# Patient Record
(demographics unavailable — no encounter records)

---

## 2025-02-03 NOTE — HISTORY OF PRESENT ILLNESS
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  Mother had had NIPS during her pregnancy and they were told that her baby had Tapia syndrome.  She was evaluated by cardiology in May 2019 and had no cardiac defect. U/S  kidney in Aug 2018 showed normal kidneys .  I ordered GH in Oct 2021 but she was only started in late Dec 2021.  She was last seen in Aug 2024 growing at 5.6 cm/yr. I increased her dose of growth hormone to 1 mg daily (0.37 mg/kg/week).  Last saw cardiology in May 2024 and examination noted to be normal. Dr. Puente did also appreciate a heart murmur but noted it to be benign.  Her sister Cristin was born in September 2023.    1st grade [TWNoteComboBox1] : Tapia syndrome

## 2025-02-03 NOTE — CONSULT LETTER
[Dear  ___] : Dear  [unfilled], [Courtesy Letter:] : I had the pleasure of seeing your patient, [unfilled], in my office today. [Please see my note below.] : Please see my note below. [Consult Closing:] : Thank you very much for allowing me to participate in the care of this patient.  If you have any questions, please do not hesitate to contact me. [Sincerely,] : Sincerely, [FreeTextEntry2] : KATHIE RODRIGUES\par   [FreeTextEntry3] : Raul Fairbanks MD\par

## 2025-02-03 NOTE — PHYSICAL EXAM
[Healthy Appearing] : healthy appearing [Well Nourished] : well nourished [Interactive] : interactive [Looks Younger than Stated Years] : looks younger than stated years [Normal Appearance] : normal appearance [Well formed] : well formed [Normally Set] : normally set [Normal S1 and S2] : normal S1 and S2 [Clear to Ausculation Bilaterally] : clear to auscultation bilaterally [Abdomen Soft] : soft [Abdomen Tenderness] : non-tender [] : no hepatosplenomegaly [1] : was Trevor stage 1 [Trevor Stage ___] : the Trevor stage for breast development was [unfilled] [Normal] : normal  [Murmur] : murmur was appreciated [de-identified] : 2/6 vibratory systolic murmur

## 2025-02-03 NOTE — PHYSICAL EXAM
[Healthy Appearing] : healthy appearing [Well Nourished] : well nourished [Interactive] : interactive [Looks Younger than Stated Years] : looks younger than stated years [Normal Appearance] : normal appearance [Well formed] : well formed [Normally Set] : normally set [Normal S1 and S2] : normal S1 and S2 [Clear to Ausculation Bilaterally] : clear to auscultation bilaterally [Abdomen Soft] : soft [Abdomen Tenderness] : non-tender [] : no hepatosplenomegaly [1] : was Trevor stage 1 [Trevor Stage ___] : the Trevor stage for breast development was [unfilled] [Normal] : normal  [Murmur] : murmur was appreciated [de-identified] : 2/6 vibratory systolic murmur

## 2025-05-06 NOTE — REASON FOR VISIT
[Follow-Up] : a follow-up visit for [Tapia Syndrome] : Tapia syndrome [Patient] : patient [Father] : father

## 2025-05-09 NOTE — CONSULT LETTER
[Today's Date] : [unfilled] [Name] : Name: [unfilled] [] : : ~~ [Today's Date:] : [unfilled] [Dear  ___:] : Dear Dr. [unfilled]: [Consult] : I had the pleasure of evaluating your patient, [unfilled]. My full evaluation follows. [Consult - Single Provider] : Thank you very much for allowing me to participate in the care of this patient. If you have any questions, please do not hesitate to contact me. [Sincerely,] : Sincerely, [DrReilly  ___] : Dr. RAY [FreeTextEntry4] : Becka Weathers MD [FreeTextEntry5] : [address] [facility] [address] [FreeTextEntry6] : [id number] [de-identified] : Kendra Tatum MD\par  Pediatric Cardiologist\par  [facility]\par  [address] [place] [address]\par  [place], [place] [address]\par  Phone: [phone]\par  FAX: [phone]\par

## 2025-05-09 NOTE — CARDIOLOGY SUMMARY
[FreeTextEntry1] : The electrocardiogram today revealed a normal sinus rhythm at a rate of 84 bpm, with a normal axis and normal ventricular forces. The measured intervals were normal. There was no ectopy seen on the surface electrocardiogram.  [FreeTextEntry2] : Summary: 1. Tapia syndrome. 2.  {S,D,S  } Situs solitus, D-ventricular looping, normally related great arteries. 3. No evidence of an atrial septal defect and intact atrial septum. 4. Normal mitral valve morphology and inflow Doppler profile. 5. Normal aortic root. 6. Aortic sinuses of Valsalva dimension (systole) = 2.0 cm (z = 0.34). 7. No evidence of aortic valve regurgitation. 8. The aortic root in cross section (PSAX) measures: 1.95 cm X 2.13 cm X 2.03 cm. The ascending aorta in cross section (PSAX) at the level of the right pulmonary artery measures: 1.74 cm. The proximal abdominal aorta and the thoracic descending aorta appear normal in caliber and uniform in contour. 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  9. Normal ascending aorta. 10. Ascending aorta dimension (systole) = 1.70 cm (z = 0.08). 11. Normal left ventricular size, morphology and systolic function. 12. Left ventricular ejection fraction by 5/6 Area x Length is normal at 68 %. 13. Normal left ventricular diastolic function. 14. Normal right ventricular morphology with qualitatively normal size and systolic function. 15. No pericardial effusion. 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

## 2025-05-09 NOTE — HISTORY OF PRESENT ILLNESS
[FreeTextEntry1] : Rebeca was evaluated at the cardiology office at the [facility] on May 6, 2025.  She is now a 7-year-old child who is followed in pediatric cardiology with the diagnosis of Tapia syndrome. Her last evaluation in our division was on May 7, 2024.  At the time of her last cardiology visit, she had a normal cardiac evaluation with no evidence of an aortopathy.  She was accompanied to the office visit today by her father.  Rebeca has had confirmatory chromosomes performed on April 2, 2018. 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   Rebeca has been doing quite nicely at home with normal development.  She is an active youngster who participates in [employer] and gymnastics and does so without any difficulties.  She is of short stature. She is followed in pediatric endocrinology by Dr. Fairbanks.  She was last seen by Dr. Fairbanks on February 3, 2025.  Rebeca was started on therapy with growth hormone in December 2021.  She is tolerating this medication. She has no known allergies. Her immunizations are up to date. She has had no previous hospitalizations or surgeries.  She is in first grade and does well socially and academically.  A review of systems was otherwise unremarkable.  Birth history: Rebeca was the 2.45 kg product of a 37-week gestation. The pregnancy was notable in that the NIPS on the fetus was positive for Monosomy X (Tapia syndrome). A fetal echocardiogram was performed at 21 weeks gestation and was within normal limits. It was recommended that the infant have followup in pediatric cardiology after birth.  A pediatric cardiology consultation was performed on April 2, 2018 while Rebeca was in the hospital. An echocardiogram performed at that time showed no evidence of a coarctation of the aorta and normal aortic valve morphology. She also had a normal renal ultrasound on August 17, 2018.  There is no family history of congenital heart disease, connective tissue disorders, cardiac surgery, or sudden unexplained death.

## 2025-05-09 NOTE — HISTORY OF PRESENT ILLNESS
[FreeTextEntry1] : Rebeca was evaluated at the cardiology office at the [facility] on May 6, 2025.  She is now a 7-year-old child who is followed in pediatric cardiology with the diagnosis of Tapia syndrome. Her last evaluation in our division was on May 7, 2024.  At the time of her last cardiology visit, she had a normal cardiac evaluation with no evidence of an aortopathy.  She was accompanied to the office visit today by her father.  Rebeca has had confirmatory chromosomes performed on April 2, 2018. 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   Rebeca has been doing quite nicely at home with normal development.  She is an active youngster who participates in [employer] and gymnastics and does so without any difficulties.  She is of short stature. She is followed in pediatric endocrinology by Dr. Fairbanks.  She was last seen by Dr. Fairbanks on February 3, 2025.  Rebeca was started on therapy with growth hormone in December 2021.  She is tolerating this medication. She has no known allergies. Her immunizations are up to date. She has had no previous hospitalizations or surgeries.  She is in first grade and does well socially and academically.  A review of systems was otherwise unremarkable.  Birth history: Rebeca was the 2.45 kg product of a 37-week gestation. The pregnancy was notable in that the NIPS on the fetus was positive for Monosomy X (Tapia syndrome). A fetal echocardiogram was performed at 21 weeks gestation and was within normal limits. It was recommended that the infant have followup in pediatric cardiology after birth.  A pediatric cardiology consultation was performed on April 2, 2018 while Rebeca was in the hospital. An echocardiogram performed at that time showed no evidence of a coarctation of the aorta and normal aortic valve morphology. She also had a normal renal ultrasound on August 17, 2018.  There is no family history of congenital heart disease, connective tissue disorders, cardiac surgery, or sudden unexplained death.

## 2025-05-09 NOTE — CARDIOLOGY SUMMARY
[FreeTextEntry1] : The electrocardiogram today revealed a normal sinus rhythm at a rate of 84 bpm, with a normal axis and normal ventricular forces. The measured intervals were normal. There was no ectopy seen on the surface electrocardiogram.  [FreeTextEntry2] : Summary: 1. Tapia syndrome. 2.  {S,D,S  } Situs solitus, D-ventricular looping, normally related great arteries. 3. No evidence of an atrial septal defect and intact atrial septum. 4. Normal mitral valve morphology and inflow Doppler profile. 5. Normal aortic root. 6. Aortic sinuses of Valsalva dimension (systole) = 2.0 cm (z = 0.34). 7. No evidence of aortic valve regurgitation. 8. The aortic root in cross section (PSAX) measures: 1.95 cm X 2.13 cm X 2.03 cm. The ascending aorta in cross section (PSAX) at the level of the right pulmonary artery measures: 1.74 cm. The proximal abdominal aorta and the thoracic descending aorta appear normal in caliber and uniform in contour. 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  9. Normal ascending aorta. 10. Ascending aorta dimension (systole) = 1.70 cm (z = 0.08). 11. Normal left ventricular size, morphology and systolic function. 12. Left ventricular ejection fraction by 5/6 Area x Length is normal at 68 %. 13. Normal left ventricular diastolic function. 14. Normal right ventricular morphology with qualitatively normal size and systolic function. 15. No pericardial effusion. 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

## 2025-05-09 NOTE — DISCUSSION/SUMMARY
[Needs SBE Prophylaxis] : [unfilled] does not need bacterial endocarditis prophylaxis [FreeTextEntry1] : In summary, Rebeca has had a normal cardiac evaluation today. On physical examination, Rebeca was noted to have a normal, functional, benign cardiac murmur, best described as a Still's murmur. She has a structurally and functionally normal heart with no evidence of a coarctation of the aorta or a bicuspid aortic valve. She has no evidence of a dilated aorta/aortopathy at this time. Her aortic dimensions remain within normal limits.  She was in a normal sinus rhythm on her electrocardiogram today and had a normal QTc interval.Of note, Rebeca's EKG should be followed serially over time, in that patients with Tapia syndrome may have prolonged QTc intervals.  Also, she was normotensive.  It is highly recommended that patients with Tapia syndrome be surveilled in pediatric cardiology. Patients with Tapia syndrome are at risk for the development of aortic aneurysms/aortopathies. Therefore, it is recommended that patients with Tapia syndrome be followed prospectively and serially in pediatric cardiology with aortic arch imaging approximately every 1 - 2 years.  Rebeca should be monitored during her routine pediatric visits, as well as during her yearly cardiology evaluations, for evidence of hypertension. Approximately 40% of girls with Tapia syndrome may develop hypertension. Should hypertension develop, she should be treated aggressively.  Individuals with a predisposition to aortic aneurysms should remain active.  Avoidance of contact or competitive sports, isometric exercises such as excessive sit ups, push-ups, pull-ups, weight lifting, and rope climbing should be adhered to.  Exercising to exhaustion is not advised.  Aerobic (moving) activities done for fun and in moderation are highly encouraged to naturally lower heart rate and blood pressure.    With the use of diagrams, the above information was explained at length to Rebeca's father and all of his questions were answered. I would like very much to reevaluate her in approximately 1 - 2 years time. Of course, should a clinical cardiology question arise, I would be most happy to re-evaluate her sooner.  Rebeca and her family should continue to follow closely with Dr. Fairbanks in pediatric endocrinology. I hope  you find this information helpful to you.    Total time spent today included reviewing diagnosis and treatment plan/monitoring, review of prior notes, review of medications and monitoring for side effects, review of last labs with patient/family, plan for continued symptom and laboratory monitoring as ordered, and time spent with patient/parent. Reviewed recent chart notes from other providers and medical records as well. This excludes time spent on procedures.

## 2025-05-09 NOTE — CONSULT LETTER
[Today's Date] : [unfilled] [Name] : Name: [unfilled] [] : : ~~ [Today's Date:] : [unfilled] [Dear  ___:] : Dear Dr. [unfilled]: [Consult] : I had the pleasure of evaluating your patient, [unfilled]. My full evaluation follows. [Consult - Single Provider] : Thank you very much for allowing me to participate in the care of this patient. If you have any questions, please do not hesitate to contact me. [Sincerely,] : Sincerely, [DrReilly  ___] : Dr. RAY [FreeTextEntry4] : Becka Weathers MD [FreeTextEntry5] : [address] [facility] [address] [FreeTextEntry6] : [id number] [de-identified] : Kendra Tatum MD\par  Pediatric Cardiologist\par  [facility]\par  [address] [place] [address]\par  [place], [place] [address]\par  Phone: [phone]\par  FAX: [phone]\par